# Patient Record
Sex: FEMALE | Employment: FULL TIME | URBAN - METROPOLITAN AREA
[De-identification: names, ages, dates, MRNs, and addresses within clinical notes are randomized per-mention and may not be internally consistent; named-entity substitution may affect disease eponyms.]

---

## 2020-06-21 ENCOUNTER — OFFICE VISIT (OUTPATIENT)
Dept: URGENT CARE | Facility: URGENT CARE | Age: 30
End: 2020-06-21
Payer: COMMERCIAL

## 2020-06-21 VITALS
TEMPERATURE: 98.3 F | HEIGHT: 66 IN | OXYGEN SATURATION: 100 % | HEART RATE: 58 BPM | BODY MASS INDEX: 18.48 KG/M2 | SYSTOLIC BLOOD PRESSURE: 126 MMHG | WEIGHT: 115 LBS | DIASTOLIC BLOOD PRESSURE: 84 MMHG

## 2020-06-21 DIAGNOSIS — R11.2 NON-INTRACTABLE VOMITING WITH NAUSEA, UNSPECIFIED VOMITING TYPE: Primary | ICD-10-CM

## 2020-06-21 LAB — HCG UR QL: NEGATIVE

## 2020-06-21 PROCEDURE — 99203 OFFICE O/P NEW LOW 30 MIN: CPT | Performed by: FAMILY MEDICINE

## 2020-06-21 PROCEDURE — 81025 URINE PREGNANCY TEST: CPT | Performed by: FAMILY MEDICINE

## 2020-06-21 RX ORDER — FLUOXETINE HYDROCHLORIDE 60 MG/1
TABLET, FILM COATED ORAL; ORAL
COMMUNITY

## 2020-06-21 RX ORDER — PROPRANOLOL HYDROCHLORIDE 20 MG/1
20 TABLET ORAL 3 TIMES DAILY
COMMUNITY

## 2020-06-21 RX ORDER — ONDANSETRON 4 MG/1
4 TABLET, ORALLY DISINTEGRATING ORAL ONCE
Status: COMPLETED | OUTPATIENT
Start: 2020-06-21 | End: 2020-06-21

## 2020-06-21 RX ADMIN — ONDANSETRON 4 MG: 4 TABLET, ORALLY DISINTEGRATING ORAL at 18:58

## 2020-06-21 ASSESSMENT — MIFFLIN-ST. JEOR: SCORE: 1263.39

## 2020-06-21 NOTE — PROGRESS NOTES
Subjective: Patient started feeling a little lightheaded today and then about an hour ago she vomited x1 and then had some loose stools.  Her last menses was about 3 weeks ago and she is taking birth control pills regularly.  No fever.  No one around her is sick.  There are not little children in her household, just her .  She did eat something this afternoon with eggs that nobody else ate but it tasted fine.    Objective: Vitals are stable.  Lungs are clear.  Heart is regular without murmurs.  Abdomen benign.  Urine pregnancy test is negative.    Assessment and plan: Nausea vomiting and diarrhea of uncertain origin, food poisoning versus gastroenteritis.  She was given ondansetron 4 mg and will try to keep fluids down and go home and wait this out.